# Patient Record
Sex: MALE | Race: BLACK OR AFRICAN AMERICAN | Employment: UNEMPLOYED | URBAN - METROPOLITAN AREA
[De-identification: names, ages, dates, MRNs, and addresses within clinical notes are randomized per-mention and may not be internally consistent; named-entity substitution may affect disease eponyms.]

---

## 2021-09-10 ENCOUNTER — OFFICE VISIT (OUTPATIENT)
Dept: NEUROLOGY | Age: 56
End: 2021-09-10
Payer: MEDICAID

## 2021-09-10 VITALS
DIASTOLIC BLOOD PRESSURE: 84 MMHG | HEIGHT: 70 IN | HEART RATE: 88 BPM | BODY MASS INDEX: 29.06 KG/M2 | SYSTOLIC BLOOD PRESSURE: 134 MMHG | OXYGEN SATURATION: 98 % | WEIGHT: 203 LBS

## 2021-09-10 DIAGNOSIS — F79 INTELLECTUAL DISABILITY: Primary | ICD-10-CM

## 2021-09-10 PROCEDURE — 99203 OFFICE O/P NEW LOW 30 MIN: CPT | Performed by: PSYCHIATRY & NEUROLOGY

## 2021-09-10 RX ORDER — GLIPIZIDE 2.5 MG/1
TABLET, EXTENDED RELEASE ORAL
COMMUNITY
Start: 2021-08-21

## 2021-09-10 RX ORDER — LOSARTAN POTASSIUM 50 MG/1
50 TABLET ORAL DAILY
COMMUNITY
Start: 2021-07-26

## 2021-09-10 RX ORDER — METFORMIN HYDROCHLORIDE 500 MG/1
TABLET, EXTENDED RELEASE ORAL
COMMUNITY
Start: 2021-07-26

## 2021-09-10 NOTE — PROGRESS NOTES
Chief Complaint   Patient presents with    Neurologic Problem     referred for headaches, and pscychological testing by PCP     Visit Vitals  /84 (BP 1 Location: Right upper arm, BP Patient Position: Sitting)   Pulse 88   Ht 5' 9.5\" (1.765 m)   Wt 203 lb (92.1 kg)   SpO2 98%   BMI 29.55 kg/m²

## 2021-09-10 NOTE — PROGRESS NOTES
RON/Spencer Menjivar PATIENT EVALUATION/CONSULTATION       PATIENT NAME: Bob Peacock    MRN: [id number]    REASON FOR CONSULTATION: Headache, referral for psychologic testing    09/10/21      HISTORY OF PRESENT ILLNESS:  Bob Peacock is a 43-year-old right-hand-dominant male who presents to [facility] for as a referral from his nurse practitioner for evaluation of headaches as well as psychologic testing. Regarding headaches they are poorly described. Initially described as occurring with some frequency later as happening once in a blue moon and appear to be triggered by stress or worry about consequences from a childhood head injury which appears overall somewhat innocuous as patient struck his head while doing a cartwheel on the ground. No additional features of headache are noted denies any clear component of nausea vomiting photo or sonophobia. Headache can occur multiple times a day without protracted duration. Patient states that he was in a coma for 3 months following his accident though his mother disputes this. Regarding psychologic testing, patient appears to have a lifelong history of learning disability or intellectual disability as he states being in \"special classes\" throughout primary school well before his accident. Did graduate high school and really was not able to hold down jobs after that. Endorses difficulty with comprehension as well as difficulty managing his mood. Regarding the latter he also endorses a longstanding issue with this is reported to have some degree of hallucinations since adolescence, has kept to himself throughout his life. Endorses delusional thoughts such as occasionally thinking that TV is interacting with him, that the TV is moving and frequently has paranoid thoughts that people around him are talking about him. Also describes a variety of other nonspecific symptoms such as hot flashes, seeing visions and nightmares. Denies any suicidal or homicidal ideation      PAST MEDICAL HISTORY:  No past medical history on file. PAST SURGICAL HISTORY:  None    FAMILY HISTORY:   Noncontributory      SOCIAL HISTORY:  Social History     Socioeconomic History    Marital status: SINGLE     Spouse name: Not on file    Number of children: Not on file    Years of education: Not on file    Highest education level: Not on file   Tobacco Use    Smoking status: Never Smoker    Smokeless tobacco: Never Used   Substance and Sexual Activity    Alcohol use: Not Currently     Social Determinants of Health     Financial Resource Strain:     Difficulty of Paying Living Expenses:    Food Insecurity:     Worried About Running Out of Food in the Last Year:     [address] in the Last Year:    Transportation Needs:     Lack of Transportation (Medical):  Lack of Transportation (Non-Medical):    Physical Activity:     Days of Exercise per Week:     Minutes of Exercise per Session:    Stress:     Feeling of Stress :    Social Connections:     Frequency of Communication with Friends and Family:     Frequency of Social Gatherings with Friends and Family:     Attends Yarsanism Services:     Active Member of Clubs or Organizations:     Attends Club or Organization Meetings:     Marital Status:          MEDICATIONS:   Current Outpatient Medications   Medication Sig Dispense Refill    glipiZIDE SR (GLUCOTROL XL) 2.5 mg CR tablet TAKE 1 TABLET BY MOUTH EVERY DAY WITH BREAKFAST      losartan (COZAAR) 50 mg tablet Take 50 mg by mouth daily.  metFORMIN ER (GLUCOPHAGE XR) 500 mg tablet 2 TABLETS WITH EVENING MEAL TWICE A DAY ORALLY 90 DAYS           ALLERGIES:  Not on File      REVIEW OF SYSTEMS:  10 point ROS reviewed with patient. Please see scanned document under media.        PHYSICAL EXAM:  Vital Signs:   Visit Vitals  /84 (BP 1 Location: Right upper arm, BP Patient Position: Sitting)   Pulse 88   Ht 5' 9.5\" (1.765 m)   Wt 203 lb (92.1 kg)   SpO2 98%   BMI 29.55 kg/m²     Pleasant male rest comfortably in exam room appearing somewhat discombobulated. HEENT appears grossly unremarkable neck appears supple. Cardiopulmonary exams are unremarkable. Abdomen is nondistended. Extremities appear warm/dry. Neurologically patient is oriented to self and situation orientation not further assessed. Attention appears impaired to casual conversation as he frequently changes the subject. Speech is intermittently dysarthric though remains intelligible 100% of the time. Language appears fluent. Cranial nerves II through XII appear grossly unremarkable there is inconsistent responses in the trigeminal distributions with no consistent abnormality. Motorically patient has normal bulk and tone 5 out of 5 strength in upper and lower extremities. Sensation appears grossly intact to fine touch in upper and lower extremities. Coordination is intact in upper extremities without dysmetria or tremor at rest with posture or intention. Reflexes are symmetric. Primary gait and station is unremarkable.     PERTINENT DATA:  None    ASSESSMENT:      Yamila Mercado is a 56-year-old male referred to [facility] neurology clinic by his nurse practitioner for evaluation of nondescript headaches as well as cognitive impairment with suspicion for lifelong learning disability as well as possible undifferentiated or paranoid schizophrenia    PLAN:  Headaches, cognitive impairment:  Not well described, variably described as a in frequent occurrence once every blue moon to more frequent than this overall patient is not taking medication for them and is not able to describe them in any fashion  Discussed that it would be best to address his mental health to evaluate for a psychosomatic component and that if headaches persisted once mental health is stabilized then they could schedule follow-up for further discussion  Patient's childhood injury appears rather mild as he hit his head while doing a cartwheel, discussed with patient that it seems unlikely he was in a coma for months from this accident  Furthermore by his own admission, patient was in \"slow classes\" throughout primary school before the accident, endorses some degree of hallucinations since adolescence suspect the patient has a lifelong history of intellectual disability as well as comorbid mood disorder  Does admit to sometimes thinking the TV is interacting with him, thinking that people around him are always talking about him, tends to keep to himself endorses at least visual hallucinations as well as experiencing \"visions\"  Overall discussed with patient that mental health providers would be the group to offer him the most help, psychologic testing is not something we do in neurology clinic  Offered to send a note to his treating provider, otherwise can follow-up as needed      Ashley Laguna MD       CC Referring provider:  No referring provider defined for this encounter.     None

## 2022-03-24 ENCOUNTER — TRANSCRIBE ORDER (OUTPATIENT)
Dept: SCHEDULING | Age: 57
End: 2022-03-24

## 2022-03-24 DIAGNOSIS — R74.01 ELEVATED LIVER TRANSAMINASE LEVEL: Primary | ICD-10-CM
